# Patient Record
Sex: FEMALE | Race: WHITE
[De-identification: names, ages, dates, MRNs, and addresses within clinical notes are randomized per-mention and may not be internally consistent; named-entity substitution may affect disease eponyms.]

---

## 2022-12-18 ENCOUNTER — HOSPITAL ENCOUNTER (INPATIENT)
Dept: HOSPITAL 97 - ER | Age: 87
LOS: 3 days | Discharge: NURSING, REHAB OR LONG-TERM CARE | DRG: 64 | End: 2022-12-21
Attending: INTERNAL MEDICINE | Admitting: HOSPITALIST
Payer: COMMERCIAL

## 2022-12-18 VITALS — BODY MASS INDEX: 24.2 KG/M2
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
## 2022-12-18 DIAGNOSIS — G81.91: ICD-10-CM

## 2022-12-18 DIAGNOSIS — C91.10: ICD-10-CM

## 2022-12-18 DIAGNOSIS — N13.2: ICD-10-CM

## 2022-12-18 DIAGNOSIS — I21.A1: ICD-10-CM

## 2022-12-18 DIAGNOSIS — M62.82: ICD-10-CM

## 2022-12-18 DIAGNOSIS — I10: ICD-10-CM

## 2022-12-18 DIAGNOSIS — G92.8: ICD-10-CM

## 2022-12-18 DIAGNOSIS — R55: ICD-10-CM

## 2022-12-18 DIAGNOSIS — I63.9: Primary | ICD-10-CM

## 2022-12-18 DIAGNOSIS — S00.03XA: ICD-10-CM

## 2022-12-18 DIAGNOSIS — E05.90: ICD-10-CM

## 2022-12-18 DIAGNOSIS — N39.0: ICD-10-CM

## 2022-12-18 LAB
BUN BLD-MCNC: 33 MG/DL (ref 7–18)
GLUCOSE SERPLBLD-MCNC: 143 MG/DL (ref 74–106)
HCT VFR BLD CALC: 46 % (ref 36–45)
INR BLD: 1.15
LYMPHOCYTES # SPEC AUTO: 20.9 K/UL (ref 0.7–4.9)
MCV RBC: 98.2 FL (ref 80–100)
MORPHOLOGY BLD-IMP: (no result)
PMV BLD: 8.8 FL (ref 7.6–11.3)
POTASSIUM SERPL-SCNC: 4.4 MMOL/L (ref 3.5–5.1)
RBC # BLD: 4.68 M/UL (ref 3.86–4.86)
TROPONIN I SERPL HS-MCNC: 197.1 PG/ML (ref ?–58.9)
TROPONIN I SERPL HS-MCNC: 241 PG/ML (ref ?–58.9)

## 2022-12-18 PROCEDURE — 99291 CRITICAL CARE FIRST HOUR: CPT

## 2022-12-18 PROCEDURE — 80048 BASIC METABOLIC PNL TOTAL CA: CPT

## 2022-12-18 PROCEDURE — 93306 TTE W/DOPPLER COMPLETE: CPT

## 2022-12-18 PROCEDURE — 74177 CT ABD & PELVIS W/CONTRAST: CPT

## 2022-12-18 PROCEDURE — 83735 ASSAY OF MAGNESIUM: CPT

## 2022-12-18 PROCEDURE — 70551 MRI BRAIN STEM W/O DYE: CPT

## 2022-12-18 PROCEDURE — 83605 ASSAY OF LACTIC ACID: CPT

## 2022-12-18 PROCEDURE — 97530 THERAPEUTIC ACTIVITIES: CPT

## 2022-12-18 PROCEDURE — 80061 LIPID PANEL: CPT

## 2022-12-18 PROCEDURE — 76377 3D RENDER W/INTRP POSTPROCES: CPT

## 2022-12-18 PROCEDURE — 97161 PT EVAL LOW COMPLEX 20 MIN: CPT

## 2022-12-18 PROCEDURE — 99292 CRITICAL CARE ADDL 30 MIN: CPT

## 2022-12-18 PROCEDURE — 51702 INSERT TEMP BLADDER CATH: CPT

## 2022-12-18 PROCEDURE — 81001 URINALYSIS AUTO W/SCOPE: CPT

## 2022-12-18 PROCEDURE — 85610 PROTHROMBIN TIME: CPT

## 2022-12-18 PROCEDURE — 87186 SC STD MICRODIL/AGAR DIL: CPT

## 2022-12-18 PROCEDURE — 87086 URINE CULTURE/COLONY COUNT: CPT

## 2022-12-18 PROCEDURE — 93880 EXTRACRANIAL BILAT STUDY: CPT

## 2022-12-18 PROCEDURE — 97112 NEUROMUSCULAR REEDUCATION: CPT

## 2022-12-18 PROCEDURE — 84443 ASSAY THYROID STIM HORMONE: CPT

## 2022-12-18 PROCEDURE — 96360 HYDRATION IV INFUSION INIT: CPT

## 2022-12-18 PROCEDURE — 81003 URINALYSIS AUTO W/O SCOPE: CPT

## 2022-12-18 PROCEDURE — 87088 URINE BACTERIA CULTURE: CPT

## 2022-12-18 PROCEDURE — 83036 HEMOGLOBIN GLYCOSYLATED A1C: CPT

## 2022-12-18 PROCEDURE — 76770 US EXAM ABDO BACK WALL COMP: CPT

## 2022-12-18 PROCEDURE — 93005 ELECTROCARDIOGRAM TRACING: CPT

## 2022-12-18 PROCEDURE — 84132 ASSAY OF SERUM POTASSIUM: CPT

## 2022-12-18 PROCEDURE — 82565 ASSAY OF CREATININE: CPT

## 2022-12-18 PROCEDURE — 87077 CULTURE AEROBIC IDENTIFY: CPT

## 2022-12-18 PROCEDURE — 36415 COLL VENOUS BLD VENIPUNCTURE: CPT

## 2022-12-18 PROCEDURE — 86850 RBC ANTIBODY SCREEN: CPT

## 2022-12-18 PROCEDURE — 70544 MR ANGIOGRAPHY HEAD W/O DYE: CPT

## 2022-12-18 PROCEDURE — 72125 CT NECK SPINE W/O DYE: CPT

## 2022-12-18 PROCEDURE — 81015 MICROSCOPIC EXAM OF URINE: CPT

## 2022-12-18 PROCEDURE — 70549 MR ANGIOGRAPH NECK W/O&W/DYE: CPT

## 2022-12-18 PROCEDURE — 86901 BLOOD TYPING SEROLOGIC RH(D): CPT

## 2022-12-18 PROCEDURE — 80053 COMPREHEN METABOLIC PANEL: CPT

## 2022-12-18 PROCEDURE — 71260 CT THORAX DX C+: CPT

## 2022-12-18 PROCEDURE — 85025 COMPLETE CBC W/AUTO DIFF WBC: CPT

## 2022-12-18 PROCEDURE — 86900 BLOOD TYPING SEROLOGIC ABO: CPT

## 2022-12-18 PROCEDURE — 84484 ASSAY OF TROPONIN QUANT: CPT

## 2022-12-18 PROCEDURE — 82550 ASSAY OF CK (CPK): CPT

## 2022-12-18 PROCEDURE — 70486 CT MAXILLOFACIAL W/O DYE: CPT

## 2022-12-18 PROCEDURE — 96361 HYDRATE IV INFUSION ADD-ON: CPT

## 2022-12-18 PROCEDURE — 84100 ASSAY OF PHOSPHORUS: CPT

## 2022-12-18 PROCEDURE — 87811 SARS-COV-2 COVID19 W/OPTIC: CPT

## 2022-12-18 PROCEDURE — 70450 CT HEAD/BRAIN W/O DYE: CPT

## 2022-12-18 RX ADMIN — ENOXAPARIN SODIUM SCH MG: 60 INJECTION SUBCUTANEOUS at 22:59

## 2022-12-18 RX ADMIN — CEFTRIAXONE SCH MLS: 1 INJECTION, POWDER, FOR SOLUTION INTRAMUSCULAR; INTRAVENOUS at 22:48

## 2022-12-18 RX ADMIN — METOPROLOL TARTRATE SCH MG: 50 TABLET, FILM COATED ORAL at 23:00

## 2022-12-18 RX ADMIN — Medication SCH ML: at 23:00

## 2022-12-18 RX ADMIN — SODIUM CHLORIDE SCH MLS: 0.9 INJECTION, SOLUTION INTRAVENOUS at 22:47

## 2022-12-18 NOTE — EDPHYS
Physician Documentation                                                                           

 [facility]                                                                 

Name: Maida Clemons                                                                               

Age: 88 yrs                                                                                       

Sex: Female                                                                                       

: 1934                                                                                   

MRN: [id number]                                                                                   

Arrival Date: 2022                                                                          

Time: 11:23                                                                                       

Account#: [id number]                                                                            

Bed 16                                                                                            

Private MD:                                                                                       

ED Physician Ava Mak                                                                         

HPI:                                                                                              

                                                                                             

13:20 This 88 yrs old Female presents to ER via EMS with complaints of Fall Injury.           sp3 

13:20 88-year-old female with history of hyperlipidemia, CLL, TIA not on any cancer treatment sp3 

      currently presents to the ED via EMS for ground-level fall and being dropped on the         

      ground since 8 PM yesterday. Patient was found by her son who activated EMS patient was     

      brought to the ER. Patient has facial injuries he also laid on her face overnight. She      

      denies any headache, back pain, neck pain, chest pain, shortness of breath, abdominal       

      pain, any other pain symptoms at this time. Vital signs were normal for EMS..               

                                                                                                  

Historical:                                                                                       

- Allergies:                                                                                      

11:44 No Known Allergies;                                                                     kc6 

- Home Meds:                                                                                      

12:08 folic acid 1 mg oral tab [Active]; atorvastatin 40 mg oral tab [Active]; methimazole 5  kc6 

      mg oral tab [Active];                                                                       

- PMHx:                                                                                           

12:06 Hyperthyroidism; Hypercholesterolemia; TIA; CLL;                                        kc6 

- PSHx:                                                                                           

12:06 Cholecystectomy; hysterectomy; back surgery;                                            kc6 

                                                                                                  

- Immunization history: Last tetanus immunization: unknown.                                       

- Social history:: Smoking status: Patient denies any tobacco usage or history of.                

                                                                                                  

                                                                                                  

ROS:                                                                                              

13:22 Eyes: Negative for injury, pain, redness, and discharge, Neck: Negative for injury,     sp3 

      pain, and swelling, Cardiovascular: Negative for chest pain, palpitations, and edema,       

      Respiratory: Negative for shortness of breath, cough, wheezing, and pleuritic chest         

      pain, Abdomen/GI: Negative for abdominal pain, nausea, vomiting, diarrhea, and              

      constipation, Back: Negative for injury and pain, Skin: Negative for injury, rash, and      

      discoloration, Neuro: Negative for headache, weakness, numbness, tingling, and seizure,     

      Psych: Negative for depression, anxiety, suicide ideation, homicidal ideation, and          

      hallucinations, Allergy/Immunology: Negative for hives, rash, and allergies, Endocrine:     

      Negative for neck swelling, polydipsia, polyuria, polyphagia, and marked weight changes.    

13:22 All other systems are negative.                                                             

                                                                                                  

Exam:                                                                                             

13:23 Neck:  Trachea midline, no thyromegaly or masses palpated, and no cervical              sp3 

      lymphadenopathy.  Supple, full range of motion without nuchal rigidity, or vertebral        

      point tenderness.  No Meningismus. Chest/axilla:  Normal chest wall appearance and          

      motion.  Nontender with no deformity.  No lesions are appreciated. Cardiovascular:          

      Regular rate and rhythm with a normal S1 and S2.  No gallops, murmurs, or rubs.  Normal     

      PMI, no JVD.  No pulse deficits. Respiratory:  Lungs have equal breath sounds               

      bilaterally, clear to auscultation and percussion.  No rales, rhonchi or wheezes noted.     

       No increased work of breathing, no retractions or nasal flaring. Abdomen/GI:  Soft,        

      non-tender, with normal bowel sounds.  No distension or tympany.  No guarding or            

      rebound.  No evidence of tenderness throughout. Back:  No spinal tenderness.  No            

      costovertebral tenderness.  Full range of motion. Skin:  Warm, dry with normal turgor.      

      Normal color with no rashes, no lesions, and no evidence of cellulitis. Neuro:  Awake       

      and alert, GCS 15, oriented to person, place, time, and situation.  Cranial nerves          

      II-XII grossly intact.  Motor strength 5/5 in all extremities.  Sensory grossly intact.     

       Cerebellar exam normal.  Normal gait. Psych:  Awake, alert, with orientation to            

      person, place and time.  Behavior, mood, and affect are within normal limits.               

13:23 Head/face: Patient has ecchymoses on the left side of her face, nose, forehead.  No         

      neck tenderness noted.  Vision is normal and extraocular movements are intact.  There       

      is no trauma or blood in the anterior chamber of the eyes..                                 

13:24 ECG was reviewed by the Attending Physician. EKG demonstrates normal sinus rhythm at 88 sp3 

      bpm with left bundle branch block, with no concordance, otherwise normal EKG.               

                                                                                                  

Vital Signs:                                                                                      

11:42  / 65; Pulse 92; Resp 18 S; Temp 98.2(O); Pulse Ox 96% on R/A; Weight 54.43 kg    kc6 

      (R); Height 4 ft. 11 in. (149.86 cm) (R); Pain 0/10;                                        

12:04 Weight 54.43 kg; Height 4 ft. 11 in. (149.86 cm);                                       eb  

14:15  / 85; Pulse 90; Resp 19 S; Pulse Ox 94% on R/A; Pain 0/10;                       kc6 

16:56 Pulse 86; Resp 18 S; Pulse Ox 100% on R/A; Pain 0/10;                                   kc6 

12:04 Body Mass Index 24.24 (54.43 kg, 149.86 cm)                                             eb  

                                                                                                  

James Coma Score:                                                                               

11:42 Eye Response: spontaneous(4). Verbal Response: confused(4). Motor Response: obeys       kc6 

      commands(6). Total: 14.                                                                     

                                                                                                  

Trauma Score (Adult):                                                                             

11:42 Eye Response: spontaneous(1); Verbal Response: confused(1); Motor Response: obeys       kc6 

      commands(2); Systolic BP: > 89 mm Hg(4); Respiratory Rate: 10 to 29 per min(4); Alger     

      Score: 14; Trauma Score: 12                                                                 

                                                                                                  

MDM:                                                                                              

11:23 Patient medically screened.                                                             sp3 

13:24 Data reviewed: vital signs, nurses notes, lab test result(s), EKG, radiologic studies.  sp3 

13:25 ED course: 88-year-old female with ground-level mechanical fall and overnight           sp3 

      entrapment onto the floor. Trauma studies are pending including CT scan of the head,        

      C-spine, facial bones, chest/abdomen/pelvis. Laboratory values also-year-old a              

      rhabdomyolysis with a CPK value of 1982 and troponin of 197. Patient has an elevated        

      WBC count but is stable given her history of CLL. We will start hydration and admit         

      patient for rhabdomyolysis and general support for her injuries. Likely discharge in        

      next 24 to 48 hours once she is ambulating again. Creatinine values are normal. We will     

      transfer patient if anything significant from a trauma standpoint however I do not          

      anticipate significant injury on her pending CT scans..                                     

                                                                                                  

                                                                                             

11:25 Order name: Basic Metabolic Panel; Complete Time: 13:07                                 sp3 

                                                                                             

11:25 Order name: CBC with Diff; Complete Time: 13:                                         sp3 

                                                                                             

11:25 Order name: Type And Screen; Complete Time: 13:                                       sp3 

                                                                                             

11:25 Order name: PT-INR; Complete Time: 11:59                                                sp3 

                                                                                             

11:25 Order name: Lactate w/ 2H reflex if indic.; Complete Time: 13:07                        sp3 

                                                                                             

11:25 Order name: CK; Complete Time: 13:07                                                    3 

                                                                                             

11:25 Order name: Troponin High Sensitivity; Complete Time: 13:07                             3 

                                                                                             

12:04 Order name: SARS RAPID; Complete Time: 13:07                                              

                                                                                             

12:33 Order name: Manual Differential; Complete Time: 13:07                                   [facility]

                                                                                             

12:40 Order name: ABO/RH no charge; Complete Time: 13:07                                      [facility]

                                                                                             

13:40 Order name: Urine Culture                                                               [facility] 

                                                                                             

13:40 Order name: Urine Microscopic Only                                                      [facility] 

                                                                                             

13:40 Order name: Urine Dipstick-Ancillary                                                    [facility]

                                                                                             

17:19 Order name: Creatine Phosphokinase                                                      [facility]

                                                                                             

11:25 Order name: CT Traumagram (Head C Spine CAP W Con)                                      [facility] 

                                                                                             

11:25 Order name: Labs collected and sent; Complete Time: 12:04                               [facility] 

                                                                                             

11:25 Order name: CT Facial Bones W/O Con; Complete Time: 13:27                               [facility] 

                                                                                             

11:29 Order name: Head C Spine Cap W Con; Complete Time: 13:27                                [facility]

                                                                                             

17:19 Order name: CONS Physician Consult                                                      [facility]

                                                                                             

17:19 Order name: Heart Healthy                                                               [facility]

                                                                                             

17:19 Order name: Echo with Doppler                                                           [facility]

                                                                                             

17:19 Order name: Creatine Phosphokinase                                                      [facility]

                                                                                             

17:19 Order name: Creatine Phosphokinase                                                      [facility]

                                                                                             

17:19 Order name: Creatine Phosphokinase                                                      [facility]

                                                                                             

17:19 Order name: Creatine Phosphokinase                                                      [facility]

                                                                                             

17:19 Order name: Lipid Profile                                                               [facility]

                                                                                             

17:19 Order name: Lipid Profile                                                               [facility]

                                                                                             

17:19 Order name: Carotid Artery Bilateral                                                    [facility]

                                                                                             

17:19 Order name: Carotid Artery Bilateral                                                    [facility]

                                                                                             

11:25 Order name: EKG - Nurse/Tech; Complete Time: 12:04                                      3 

                                                                                             

12:05 Order name: Roberto; Complete Time: 12:31                                                 kc6 

                                                                                             

13:40 Order name: Urine Dipstick-Ancillary (obtain specimen); Complete Time: 13:40            kc6 

                                                                                                  

Administered Medications:                                                                         

13:40 Drug: NS 0.9% 1000 ml Route: IV; Rate: 1 bolus; Site: right antecubital;                kc6 

16:39 Follow up: Response: No adverse reaction; IV Status: Completed infusion; IV Intake:     kc6 

      1000ml                                                                                      

                                                                                                  

                                                                                                  

Disposition Summary:                                                                              

22 13:31                                                                                    

Hospitalization Ordered                                                                           

      Hospitalization Status: Observation                                                     sp3 

      Provider: Fausto Page                                                                sp3 

      Condition: Stable                                                                       sp3 

      Problem: new                                                                            sp3 

      Symptoms: are unchanged                                                                 sp3 

      Bed/Room Type: Standard                                                                 sp3 

      Location: Telemetry/MedSurg (Inpatient)(22 17:24)                                   

      Room Assignment: [facility](22 17:24)                                                      

      Diagnosis                                                                                   

        - Rhabdomyolysis                                                                      sp3 

        - Mechanical fall, scalp hematoma, facial contusions                                  sp3 

      Forms:                                                                                      

        - Medication Reconciliation Form                                                      sp3 

        - SBAR form                                                                           sp3 

Signatures:                                                                                       

Dispatcher [employer]                           EDGilda Leos RN                      RN                                                      

Ava Mak MD MD   sp3                                                  

Evelyn James RN                   RN   kc6                                                  

                                                                                                  

Corrections: (The following items were deleted from the chart)                                    

17:24 13:31 Telemetry/MedSurg (observation) sp3                                                 

17:24 13:31 sp3                                                                               ss  

                                                                                                  

**************************************************************************************************

## 2022-12-18 NOTE — RAD REPORT
EXAM DESCRIPTION:  CT - Head C  Spine Cap W Con - 12/18/2022 12:48 pm

 

CLINICAL HISTORY:  trauma, fall with head and facial trauma, neck pain, chest pain and abdomen pain

 

COMPARISON:  <Comparisons>

 

TECHNIQUE:  Axial 5 mm CT head images were obtained. Axial 2 mm CT cervical spine images were obtaine
d with sagittal and coronal reconstruction images reviewed. During dynamic enhancement of 100mL non-i
onic contrast, axial 5 mm images of the chest, abdomen and pelvis were obtained. Biphasic technique p
erformed of the abdomen and pelvis.

 

Oral contrast: None

 

All CT scans are performed using dose optimization technique as appropriate and may include automated
 exposure control or mA/KV adjustment according to patient size.

 

FINDINGS:  No intracranial hemorrhage, mass or edema. No midline shift or abnormal fluid collection. 
Mild for age atrophy and chronic ischemic change. Ventricles are in proportion. Arterial and physiolo
gic calcifications are present. Mastoid air cells are clear. No skullbase fracture seen. No skull fra
cture.  Facial bones, orbits and sinuses are separately detailed. Moderate size scalp hematoma seen l
eft-side of the skull. No underlying bone injury.

 

Advanced degenerative changes are present at the dens -C1 level. Transverse ligament posterior to the
 dens is thickened and partially mineralized. Several degenerative cysts are clustered at the base of
 the dens, largest 7 mm in size. The large cyst has a thinned posterior cortex at the base of the den
s. A fracture is not confirmed. Anterior subluxation of C7 on T1 is present secondary to facet degene
rative change. All cervical disc levels except C2-3 show significant loss in disc height. Prominent e
ndplate spurs are present at C5-6 and C6-7. Multilevel facet joint degenerative change seen with mult
ilevel bony foraminal stenosis. No other alignment abnormalities. Paraspinal hematoma is not seen. Th
ere is some congestion and edema in the subcutaneous fat on the left. There are numerous bilateral ce
rvical lymph nodes present more pronounced on the left. No malignant history is known for the patient
. A pharyngeal origin mass is not seen. Adenopathy extends into the left base the neck and supraclavi
cular region. Concerns for traumatic disc herniation or traumatic cord injury can be further addresse
d with MR imaging. Small nodules are seen in the thyroid gland without of the mass in.

 

CT chest shows no pneumothorax, pulmonary contusion or pleural fluid collection. No mediastinal hemat
omega and the aorta and pulmonary arteries are unremarkable. No chest wall mass. No fracture of the ann
rnum seen. There are no displaced rib fracture is present and no nondisplaced rib fractures suspected
. Patient has a large hiatal hernia approximately 50% of the stomach intrathoracic.

 

Abnormal pathologic appearing right axillary lymph nodes are present. A small number of lymph nodes s
een in the left axilla.

 

No acute traumatic injury to the liver, spleen or pancreas. Splenomegaly is present. Gallbladder is a
bsent. No abnormal degree of biliary tree dilatation. Fullness of the biliary tree is within range of
 normal for a post cholecystectomy patient. Left-sided parapelvic cysts are present. There is a large
 staghorn calculus filling and enlarging the right collecting system. There is mild dilatation throug
hout the course of the right ureter without obstructing calculus seen. Roberto catheter is present in t
he urinary bladder which is partially filled. There is intermediate density material layering along t
he dependent portion of the urinary bladder. This could be hemorrhagic material. Mass is possible but
 lesser in likelihood. No bladder wall thickening is suspected.

 

No acute bowel finding seen. Moderate stool volume dilates the rectum to 7 cm.

 

Advanced bony degenerative changes are present. Significant left convex lumbar scoliosis is present. 
An acute pelvic or vertebral body finding is not seen. No fracture of either proximal femur.

 

No significant vascular finding.

 

 

IMPRESSION:  No hemorrhage or other acute intracranial finding identifiable. Moderate size scalp hema
lorri seen on the left.

 

Facial bones, orbits and sinuses are separately detailed.

 

Advanced degenerative changes are present involving the dens and adjacent structures but no fracture 
of the dens or C2 body confirmed. Overall prominent cervical spine degenerative change with no acute 
bone abnormality.

 

Pathologic lymphadenopathy is present in the neck and bilateral axilla. No malignant history is known
 for this patient though findings are concerning for lymphoma or other neoplastic process.

 

No acute traumatic injury to the chest.

 

No acute traumatic injury to the abdomen or pelvis. Right-sided hydronephrosis is present without an 
obstructing calculus. There is a large right-sided staghorn calculus. Intermediate density material l
bernard along the posterior or dependent portion of the urinary bladder. This could be hemorrhagic mate
rial. A mass is unlikely but not entirely excluded. Roberto catheter is in place.

 

Advanced degenerative changes to the spine without acute finding seen. No pelvic or proximal femur fr
acture.

## 2022-12-18 NOTE — ER
Nurse's Notes                                                                                     

 [facility]                                                                 

Name: Maida Clemons                                                                               

Age: 88 yrs                                                                                       

Sex: Female                                                                                       

: 1934                                                                                   

MRN: [id number]                                                                                   

Arrival Date: 2022                                                                          

Time: 11:23                                                                                       

Account#: [id number]                                                                            

Bed 16                                                                                            

Private MD:                                                                                       

Diagnosis: Rhabdomyolysis;Mechanical fall, scalp hematoma, facial contusions                      

                                                                                                  

Presentation:                                                                                     

                                                                                             

11:29 Chief complaint: EMS states: client had an unwitnessed fall last night at about 8pm and kc6 

      was just found 30min ago. no blood thinners, client denies LOC. Care prior to arrival:      

      None. Mechanism of Injury: Fall from standing position. fell to the floor. Trauma event     

      details: Injury occurred in the [facility], Injury occurred: at home. Injury        

      occurred: 2022 Injury occurred at: 20:00.                                      

11:29 Acuity: YUDI 1                                                                           kc6 

11:29 Method Of Arrival: EMS: [facility]                                                       [facility] 

11:44 Ebola Screen: No symptoms or risks identified at this time. Initial Sepsis Screen: Does kc6 

      the patient meet any 2 criteria? Altered Mental Status. No. Patient's initial sepsis        

      screen is negative. Does the patient have a suspected source of infection? No.              

      Patient's initial sepsis screen is negative. Risk Assessment: Do you want to hurt           

      yourself or someone else? Patient reports no desire to harm self or others. Onset of        

      symptoms was 2022 at 20:00.                                                    

14:43 Coronavirus screen: At this time, the client does not indicate any symptoms associated  kc6 

      with coronavirus-19.                                                                        

                                                                                                  

Trauma Activation: Alert                                                                          

 Physician: ED Physician; Name: Dr. Mak; Notified At: ; Arrived At:                             

 Physician: General Surgeon; Name: ; Notified At: ; Arrived At:                                   

 Physician: Radiology; Name: ; Notified At: ; Arrived At:                                         

 Physician: Respiratory; Name: ; Notified At: ; Arrived At:                                       

 Physician: Lab; Name: ; Notified At: ; Arrived At:                                               

                                                                                                  

Historical:                                                                                       

- Allergies:                                                                                      

11:44 No Known Allergies;                                                                     kc6 

- Home Meds:                                                                                      

12:08 folic acid 1 mg oral tab [Active]; atorvastatin 40 mg oral tab [Active]; methimazole 5  kc6 

      mg oral tab [Active];                                                                       

- PMHx:                                                                                           

12:06 Hyperthyroidism; Hypercholesterolemia; TIA; CLL;                                        kc6 

- PSHx:                                                                                           

12:06 Cholecystectomy; hysterectomy; back surgery;                                            kc6 

                                                                                                  

- Immunization history: Last tetanus immunization: unknown.                                       

- Social history:: Smoking status: Patient denies any tobacco usage or history of.                

                                                                                                  

                                                                                                  

Screenin:43 Abuse screen: Denies threats or abuse. Denies injuries from another. Tuberculosis       kc6 

      screening: No symptoms or risk factors identified.                                          

12:09 [facility] ED Fall Risk Assessment (Adult) History of falling in the last 3 months,       kc6 

      including since admission Yes- single mechanical fall (1 pt) Confusion or                   

      Disorientation Yes (5 pts) Intoxicated or Sedated No (0 pts) Impaired Gait Yes (1 pt)       

      Mobility Assist Device Used Yes (1 pt) Altered Elimination Yes (1 pt) Score/Fall Risk       

      Level 3 or more points = High Risk Oriented to surroundings, Maintained a safe              

      environment, Educated pt \T\ family on fall prevention, incl call for assistance when       

      getting out of bed, Assessed \T\ reinforced patient's understanding of fall precautions,    

      Provided non-skid footwear, Hourly rounding (assess needs \T\ fall precautionary            

      measures) done, Used ambulatory aids as needed (educated on \T\ assisted with), Used gait   

      belt as appropriate Implemented a Fall Risk Plan of Care, Apply high fall risk patient      

      identification: yellow non skid footwear/ fall signage, Placed fall mat w/ non beveled      

      edge next to bed, Activated bed/chair alarm, Remained with patient while ambulating,        

      Utilized family, sitter, or virtual  as indicated. Nutritional screening: No       

      deficits noted.                                                                             

                                                                                                  

Primary Survey:                                                                                   

11:37 NO uncontrolled hemorrhage observed. Breathing/Chest: Spontaneous respiratory effort,   kc6 

      equal unlabored respirations, breath sounds clear bilaterally, regular pattern,             

      symmetrical chest rise and fall. Respiratory effort: spontaneous, unlabored, Breath         

      sounds: clear, bilaterally. Respiratory pattern: regular, Chest inspection: symmetrical     

      rise and fall of the chest. Circulation: No external hemorrhage present. Regular and        

      strong central pulse, skin warm/dry/normal color. Hemorrhage: No external hemorrhage        

      noted. Pulses: Skin color: pink, Skin temperature: warm, dry, Cardiac rhythm: sinus         

      rhythm Heart tones present. Disability Pupils are equal, round, reactive to light and       

      accommodation. Client is alert. Client responds to verbal stimuli. Client reponds to        

      painful stimuli. Exposure/Environment: All clothing and personal items were removed.        

      There is no evidence of uncontrolled external bleeding. No obvious injuries are noted       

      at this time. A warming method has been applied: A warm blanket has been provided to        

      the patient.                                                                                

12:15 Reassessment Alertness and Airway: Awake and alert. The airway is patent. Airway Patent kc6 

      Breathing: Spontaneous respiratory effort, equal unlabored respirations, breath sounds      

      clear bilaterally, regular pattern with symmetrical chest rise and fall. Respiratory        

      effort Spontaneous Unlabored Breath sounds Clear Respiratory pattern Regular Chest          

      inspection Symmetrical Circulation: No external hemorrhage noted. Regular and strong        

      central pulse, skin warm/dry/normal color. Heart rhythm Sinus rhythm Heart tones            

      Present Color Pink Temperature Warm Dry Disability: Pupils Pupils are equal, round,         

      reactive to light and accomodation. Alert Verbal stimuli Painful stimuli.                   

                                                                                                  

Secondary Survey:                                                                                 

11:40 HEENT: Face Other bruising and redness noted to the left face, chin, and neck. Nose:    kc6 

      dried blood noted to the left nare.. Gastrointestinal: No deficits noted. : No signs      

      and/or symptoms were reported regarding the genitourinary system. Musculoskeletal: No       

      signs and/or symptoms reported regarding the musculoskeletal system.                        

                                                                                                  

Assessment:                                                                                       

11:32 General: Appears in no apparent distress. comfortable, Behavior is calm, cooperative,   kc6 

      appropriate for age. General: Smells of stale urine. Pain: Denies pain. Neuro: Baez     

      Agitation-Sedation Scale (RASS): 0 - Alert and Calm Level of Consciousness is awake,        

      alert, obeys commands, Oriented to person, place. EENT: Sclera/Cornea are reddened in       

      outer aspect of conjuctiva of right eye, inner aspect of conjuctiva of right eye, outer     

      aspect of conjuctiva of left eye and inner aspect of conjunctiva of left eye Nares          

      dried blood noted to the left nare.. Cardiovascular: Heart tones S1 S2 present              

      Capillary refill < 3 seconds. Respiratory: Airway is patent Trachea midline Respiratory     

      effort is even, unlabored, Respiratory pattern is regular, symmetrical, Breath sounds       

      are clear bilaterally. GI: No signs and/or symptoms were reported involving the             

      gastrointestinal system. : No signs and/or symptoms were reported regarding the           

      genitourinary system. Derm:. Derm: Skin has skin tears on left side of the face, chin,      

      and neck as well as the MARICEL knees. Skin is dry, Skin is normal, Skin temperature is         

      warm. Musculoskeletal: No signs and/or symptoms reported regarding the musculoskeletal      

      system. Circulation, motion, and sensation intact. Capillary refill < 3 seconds, Range      

      of motion: intact in all extremities.                                                       

11:47 Reassessment: cleansed patient of urine soaked clothes and placed into a clean gown and ss  

      brief. Side rails up x 2. Pt tolerated well. Awaiting CT.                                   

12:35 Reassessment: Patient appears in no apparent distress at this time. No changes from     kc6 

      previously documented assessment. Patient and/or family updated on plan of care and         

      expected duration. Pain level reassessed. client is alert and oriented to person and        

      place but not time and situation. respiration even and unlabored. skin is warm, dry,        

      and pink. Patient denies pain at this time.                                                 

13:35 Reassessment: Patient appears in no apparent distress at this time. No changes from     kc6 

      previously documented assessment. Patient and/or family updated on plan of care and         

      expected duration. Pain level reassessed. client is alert and oriented to person and        

      place but situation and time. respirations even and unlabored. skin is warm, dry, and       

      pink. Patient denies pain at this time.                                                     

14:27 Reassessment: Augustus Clemons(son) [phone].                                            jd3 

14:35 Reassessment: Patient appears in no apparent distress at this time. No changes from     kc6 

      previously documented assessment. Patient and/or family updated on plan of care and         

      expected duration. Pain level reassessed.                                                   

15:35 Reassessment: Patient appears in no apparent distress at this time. No changes from     kc6 

      previously documented assessment. Patient and/or family updated on plan of care and         

      expected duration. Pain level reassessed. Reassessment: Patient appears in no apparent      

      distress at this time. No changes from previously documented assessment. Patient and/or     

      family updated on plan of care and expected duration. Pain level reassessed.                

16:35 Reassessment: Patient appears in no apparent distress at this time. No changes from     kc6 

      previously documented assessment. Patient and/or family updated on plan of care and         

      expected duration. Pain level reassessed.                                                   

17:35 Reassessment: Patient appears in no apparent distress at this time. No changes from     kc6 

      previously documented assessment. Patient and/or family updated on plan of care and         

      expected duration. Pain level reassessed.                                                   

17:36 Reassessment: attempted to call report to second floor. stated the nurse is passing     kc6 

      meds and will call me back.                                                                 

18:09 Reassessment: attempted to call report to second floor after missed call. stated the    kc 

      nurse is passing meds and will have to call me back.                                        

                                                                                                  

Vital Signs:                                                                                      

11:42  / 65; Pulse 92; Resp 18 S; Temp 98.2(O); Pulse Ox 96% on R/A; Weight 54.43 kg    kc6 

      (R); Height 4 ft. 11 in. (149.86 cm) (R); Pain 0/10;                                        

12:04 Weight 54.43 kg; Height 4 ft. 11 in. (149.86 cm);                                       eb  

14:15  / 85; Pulse 90; Resp 19 S; Pulse Ox 94% on R/A; Pain 0/10;                       kc6 

16:56 Pulse 86; Resp 18 S; Pulse Ox 100% on R/A; Pain 0/10;                                   kc6 

12:04 Body Mass Index 24.24 (54.43 kg, 149.86 cm)                                             eb  

                                                                                                  

James Coma Score:                                                                               

11:42 Eye Response: spontaneous(4). Verbal Response: confused(4). Motor Response: obeys       kc6 

      commands(6). Total: 14.                                                                     

                                                                                                  

Trauma Score (Adult):                                                                             

11:42 Eye Response: spontaneous(1); Verbal Response: confused(1); Motor Response: obeys       kc6 

      commands(2); Systolic BP: > 89 mm Hg(4); Respiratory Rate: 10 to 29 per min(4); James     

      Score: 14; Trauma Score: 12                                                                 

                                                                                                  

ED Course:                                                                                        

11:23 Patient arrived in ED.                                                                  eb  

11:23 Ava Mak MD is Attending Physician.                                                sp3 

11:28 Evelyn James, RN is Primary Nurse.                                                 kc6 

11:32 Triage completed.                                                                       kc6 

11:44 Arm band placed on.                                                                     kc6 

11:45 Patient has correct armband on for positive identification. Placed in gown. Bed in low  kc6 

      position. Call light in reach. Side rails up X2.                                            

11:45 Patient maintains SpO2 saturation greater than 95% on room air.                         kc6 

11:45 Thermoregulation: warm blanket given to patient.                                        kc6 

12:31 SARS RAPID Sent.                                                                        kc6 

12:31 Roberto cath inserted, using sterile technique, 16 Fr., by me, balloon inflated, to       kc6 

      gravity drainage, clamped. Patient tolerated well.                                          

12:49 CT Facial Bones W/O Con In Process Unspecified.                                         EDMS

12:50 Head C Spine Cap W Con In Process Unspecified.                                          EDMS

13:30 Fausto Page MD is Hospitalizing Provider.                                           sp3 

14:42 No provider procedures requiring assistance completed. Patient admitted, IV remains in  kc6 

      place.                                                                                      

                                                                                                  

Administered Medications:                                                                         

13:40 Drug: NS 0.9% 1000 ml Route: IV; Rate: 1 bolus; Site: right antecubital;                kc6 

16:39 Follow up: Response: No adverse reaction; IV Status: Completed infusion; IV Intake:     kc6 

      1000ml                                                                                      

                                                                                                  

                                                                                                  

Medication:                                                                                       

11:47 VIS not applicable for this client.                                                     ss  

                                                                                                  

Intake:                                                                                           

16:39 IV: 1000ml; Total: 1000ml.                                                              kc6 

                                                                                                  

Outcome:                                                                                          

13:31 Decision to Hospitalize by Provider.                                                    sp3 

14:42 Condition: stable                                                                       kc6 

14:42 Instructed on the need for admit.                                                           

14:43 Patient's length of stay in the Emergency Department was greater than 2 hours. due to   kc6 

      results and admission.Patient's length of stay extended due to                              

17:37 Admitted to Med/surg accompanied by tech, via stretcher, room 203, with chart, Report   kc6 

      called to  GREGG Rojas                                                                       

19:07 Patient left the ED.                                                                    jb4 

                                                                                                  

Signatures:                                                                                       

Dispatcher [employer]                           EDMS                                                 

Gilda Robertson RN RN ss Bryson, James, RN RN   jb4                                                  

Patrice Yancey RN                    RN   Renae Portillo Setul, MD MD   sp3                                                  

Evelyn James RN                   RN   kc6                                                  

                                                                                                  

Corrections: (The following items were deleted from the chart)                                    

14:14 11:42  / 65; Pulse 92bpm; Resp 18bpm; Spontaneous; Pulse Ox 96% RA; Pain 0/10; kc6kc6 

16:56 11:37 Reassessment Alertness and Airway: Awake and alert. The airway is patent. Airway  kc6 

      Patent Breathing: Spontaneous respiratory effort, equal unlabored respirations, breath      

      sounds clear bilaterally, regular pattern with symmetrical chest rise and fall.             

      Respiratory effort Spontaneous Unlabored Breath sounds Clear Respiratory pattern            

      Regular Chest inspection Symmetrical Circulation: No external hemorrhage noted. Regular     

      and strong central pulse, skin warm/dry/normal color. Heart rhythm Sinus rhythm Heart       

      tones Present Color Pink Temperature Warm Dry Disability: Pupils Pupils are equal,          

      round, reactive to light and accomodation. Alert Verbal stimuli Painful stimuli kc6         

18:54 17:37 Admitted to Med/surg accompanied by tech, via stretcher, room 203, with chart, [id number] 

                                                                                                  

**************************************************************************************************

## 2022-12-18 NOTE — RAD REPORT
EXAM DESCRIPTION:  CT - Facial Bones W/ Mpr - 12/18/2022 12:47 pm

 

CLINICAL HISTORY:  Fall, face and left-sided skull injury.

 

COMPARISON:  CT trauma grams same date

 

TECHNIQUE:  Axial 2 millimeter thick images of the facial bones were obtained with sagittal and coron
al reconstruction imaging.

 

All CT scans are performed using dose optimization technique as appropriate and may include automated
 exposure control or mA/KV adjustment according to patient size.

 

FINDINGS:  Mandible is intact. Condyles are normally positioned. Mastoid air cells are clear with no 
skullbase fracture seen. No acute paranasal sinus finding. Globes and orbital contents are normal. Fa
cial bone fracture is not seen. Left-sided scalp hematoma is present over the partially imaged. Under
lying bone is intact. Pathologic lymphadenopathy is not seen further detailed on the CT trauma report
.

 

IMPRESSION:  No facial bone fracture.

 

Please see significant additional findings on CT trauma report.

## 2022-12-19 LAB
ALBUMIN SERPL BCP-MCNC: 2.8 G/DL (ref 3.4–5)
ALP SERPL-CCNC: 89 U/L (ref 45–117)
ALT SERPL W P-5'-P-CCNC: 55 U/L (ref 13–56)
AST SERPL W P-5'-P-CCNC: 103 U/L (ref 15–37)
BUN BLD-MCNC: 28 MG/DL (ref 7–18)
GLUCOSE SERPLBLD-MCNC: 95 MG/DL (ref 74–106)
HCT VFR BLD CALC: 37.4 % (ref 36–45)
HDLC SERPL-MCNC: 38 MG/DL (ref 40–60)
LDLC SERPL CALC-MCNC: 27 MG/DL (ref ?–130)
LYMPHOCYTES # SPEC AUTO: 18.5 K/UL (ref 0.7–4.9)
MCV RBC: 98.9 FL (ref 80–100)
MORPHOLOGY BLD-IMP: (no result)
PMV BLD: 8.6 FL (ref 7.6–11.3)
POTASSIUM SERPL-SCNC: 3.6 MMOL/L (ref 3.5–5.1)
RBC # BLD: 3.78 M/UL (ref 3.86–4.86)
SMUDGE CELLS BLD QL SMEAR: (no result)
TROPONIN I SERPL HS-MCNC: 239.5 PG/ML (ref ?–58.9)

## 2022-12-19 RX ADMIN — CEFTRIAXONE SCH MLS: 1 INJECTION, POWDER, FOR SOLUTION INTRAMUSCULAR; INTRAVENOUS at 08:44

## 2022-12-19 RX ADMIN — CEFTRIAXONE SCH MLS: 1 INJECTION, POWDER, FOR SOLUTION INTRAMUSCULAR; INTRAVENOUS at 22:03

## 2022-12-19 RX ADMIN — METOPROLOL TARTRATE SCH MG: 50 TABLET, FILM COATED ORAL at 08:45

## 2022-12-19 RX ADMIN — ENOXAPARIN SODIUM SCH MG: 60 INJECTION SUBCUTANEOUS at 08:45

## 2022-12-19 RX ADMIN — Medication SCH ML: at 08:45

## 2022-12-19 RX ADMIN — Medication SCH ML: at 22:04

## 2022-12-19 RX ADMIN — SODIUM CHLORIDE SCH MLS: 0.9 INJECTION, SOLUTION INTRAVENOUS at 08:44

## 2022-12-19 RX ADMIN — Medication SCH ML: at 22:05

## 2022-12-19 RX ADMIN — SODIUM CHLORIDE SCH MLS: 0.9 INJECTION, SOLUTION INTRAVENOUS at 22:03

## 2022-12-19 RX ADMIN — ATORVASTATIN CALCIUM SCH MG: 40 TABLET, FILM COATED ORAL at 22:04

## 2022-12-19 RX ADMIN — ASPIRIN SCH MG: 81 TABLET, COATED ORAL at 08:45

## 2022-12-19 RX ADMIN — CLOPIDOGREL BISULFATE SCH MG: 75 TABLET, FILM COATED ORAL at 18:30

## 2022-12-19 NOTE — P.HP
Certification for Inpatient


Patient admitted to: Inpatient


With expected LOS: >2 Midnights


Patient will require the following post-hospital care: None


Practitioner: I am a practitioner with admitting privileges, knowledge of 

patient current condition, hospital course, and medical plan of care.


Services: Services provided to patient in accordance with Admission requirements

found in Title 42 Section 412.3 of the Code of Federal Regulations





Patient History


Date of Service: 12/18/22


Reason for admission: Syncope; rhabdomyolysis; status post fall with facial 

bruising


History of Present Illness: 





Patient is an 88-year-old female who came to the hospital after falling.  

Patient had a friend come by her home at around room 4:00 p.m. on Saturday but 

she did not answer the door. the next morning she was scheduled to get her nails

done but she was not picking up the phone.  This was around 9:00 a.m..  Her son 

went by the house and found her on the ground.  She was laying face down.  She 

appeared obtunded.  EMS was called and they brought her into the hospital.  

Patient had a lot of facial bruising.  Extensive facial bruising to the left 

side of her face as well as bilateral knee bruising and some mild swelling.  

Patient has pretty good range of motion in her hips.  She moves her arms and 

legs appropriately.  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  She did 

not have a pronator drift.  She does follow commands fairly well.  Her son is 

stating that her mentation is improved from when she 1st got in the hospital.  

In the emergency room she had extensive workup done including CT imaging and 

there was no evidence of fractures of the extremity.  She did have some 

lymphadenopathy suggestive of lymphoma.  She does have a history of CLL.  The 

family  believes they have been told that this was secondary to her CLL.  She 

was also found to have a staghorn calculus.  She has right-sided hydronephrosis.

  Patient also had elevated CPK.  Will hydrate.  Will treat with antibiotics for

presumed urinary tract infection.  We will also consult Urology and get a renal 

ultrasound.  She has no flank tenderness at this time. Patient will be admitted 

to the hospital for further treatment.


Allergies





NKDA Allergy (Uncoded 01/08/16 17:35)


   Unknown





Home Medications: 








Docusate Sodium [Colace] 50 mg PO DAILY 08/13/14 


Hydrocodone 10/APAP 325 [Norco 10/325*] 1 tab PO Q4HP PRN 08/13/14 


Pravastatin Sodium [Pravachol] 20 mg PO BEDTIME 08/13/14 


Vits A,C,E/Lutein/Minerals [Ocuvite with Lutein Tablet] 1 each PO DAILY 08/13/14




methIMAzole [Tapazole] 0.5 tab PO DAILY 08/13/14 


methocarbamoL [Robaxin*] 500 mg PO TID PRN 08/13/14 


Levofloxacin [Levaquin] 500 mg PO DAILY #7 tablet 08/14/14 


metroNIDAZOLE [Flagyl*] 500 mg PO Q8H #21 tablet 08/14/14 








- Past Medical/Surgical History


Has patient received pneumonia vaccine in the past: No


Diabetic: No


-: Sciatica


-: CLL


-: Lumpectomy Right Breast


-: Cholecystectomy


-: Hysterectomy


-: Left Wrist Surgery


-: MARICEL knee replacement





- Family History


  ** Father


Family History: Reviewed- Non-Contributory





- Social History


Smoking Status: Former smoker


Alcohol use: Yes


Caffeine use: Yes


Place of Residence: Home





Review of Systems


10-point ROS is otherwise unremarkable





Physical Examination





- Vital Signs


Temperature: 98.1 F


Blood Pressure: 145/64


Pulse: 84


Respirations: 19


Pulse Ox (%): 96





- Physical Exam


General: Alert, In no apparent distress, Oriented x2, Confused


HEENT: Atraumatic, PERRLA, Mucous membr. moist/pink, EOMI, Sclerae nonicteric


Neck: Supple, 2+ carotid pulse no bruit, No LAD, Without JVD or thyroid 

abnormality


Respiratory: Clear to auscultation bilaterally, Normal air movement


Cardiovascular: Regular rate/rhythm, Normal S1 S2, No murmurs


Gastrointestinal: Normal bowel sounds, Soft and benign, Non-distended, No 

tenderness


Musculoskeletal: No clubbing, No swelling, No tenderness


Integumentary: No rashes


Neurological: Normal gait, Normal speech, Normal strength at 5/5 x4 extr, Normal

 tone, Sensation intact, Cranial nerves 3-12 intact, Normal affect


Lymphatics: No axilla or inguinal lymphadenopathy





- Studies


Laboratory Data (last 24 hrs)





12/18/22 11:41: PT 12.6 H, INR 1.15


12/18/22 11:41: WBC 35.40 H*, Hgb 14.8, Hct 46.0 H, Plt Count 192


12/18/22 11:41: Sodium 142, Potassium 4.4, BUN 33 H, Creatinine 0.72, Glucose 

143 H








Assessment & Plan





- Problems (Diagnosis)


(1) Rhabdomyolysis


Current Visit: Yes   Status: Acute   





(2) CLL (chronic lymphocytic leukemia)


Current Visit: Yes   Status: Acute   





(3) Elevated troponin


Current Visit: Yes   Status: Acute   





(4) Facial contusion


Current Visit: Yes   Status: Acute   





(5) Bilateral knee swelling


Current Visit: Yes   Status: Acute   





(6) Staghorn calculus


Current Visit: Yes   Status: Acute   





(7) Hydronephrosis


Current Visit: Yes   Status: Acute   





- Plan





Plan:


1.  Gentle hydration


2.  IV antibiotics 


3.  Renal ultrasound


4.  Echo


5.  Cardiology consult


6.  Urology consultation 


7. monitor CPK level


8. follow troponin trend


9. physical therapy evaluation


10. MRI of the brain


11.  Carotid Doppler


12. GI and DVT prophylaxis


Discharge Plan: Home


Plan to discharge in: Greater than 2 days





- Advance Directives


Does patient have a Living Will: No


Does patient have a Durable POA for Healthcare: Yes





- Code Status/Comfort Care


Code Status Assessed: Yes


Code Status: Full Code


Critical Care: No


Time Spent Managing PTS Care (In Minutes): 45

## 2022-12-19 NOTE — RAD REPORT
EXAM DESCRIPTION:  MRI - Brain Wo Cont - 12/19/2022 9:50 am

 

CLINICAL HISTORY:  Syncope; r/o CVA

 

COMPARISON:  Head C  Spine Cap W Con dated 12/18/2022

 

TECHNIQUE:  Sagittal T1-weighted images were obtained along with axial PD, heavily T2-weighted and T2
-FLAIR images. Axial DWI and ADC mapping sequences were also obtained along with coronal heavily T2-w
eighted images.

 

FINDINGS:  Diffusion imaging shows no acute infarction in the cerebral or cerebellar hemispheres. Of 
the medulla pontine junction slightly left of midline there is a punctate 1 mm area increased signal 
on the diffusion weighted sequence. There is a corresponding area of diminished signal on the ADC map
ping sequence. This would suggest a punctate acute/ subacute nonhemorrhagic infarction. No definitive
 correlate on the T2/IR sequencing. No other suspicion for an acute infarction. The patient has atrop
hy with ventricles in proportion. Chronic ischemic changes are scattered in the cerebral white matter
. There is no edema or shift of midline structures. No extra-axial fluid collections. Gray-matter/whi
te matter junction is preserved. Signal voids are seen as a normal finding in the major intracranial 
vessels.

 

No globe or orbital content acute finding.

 

Mastoid air cells and paranasal sinuses are clear.

 

 

IMPRESSION:  Punctate 1 mm sized brainstem nonhemorrhagic acute/ subacute CVA just left of midline at
 the medulla brunilda junction.

 

Moderate atrophy with ventricles in proportion. Mild to moderate for age scattered chronic ischemic c
hange in the cerebral white matter.

## 2022-12-19 NOTE — RAD REPORT
EXAM DESCRIPTION:  US - Renal Ultrasound-Complete - 12/19/2022 2:25 pm

 

CLINICAL HISTORY:  Abdominal pain

 

COMPARISON:  July 2022

 

FINDINGS:  The right kidney measures 9 cm with a normal echotexture. 1.8 calculus unchanged. No hydro
nephrosis

 

The left kidney measures 9 cm with a normal echotexture. No significant change in either the parapelv
ic cysts or mild left hydronephrosis

 

Hydronephrosis is not seen.

 

Roberto catheter within a collapsed bladder.

 

IMPRESSION:  1.8 centimeter nonobstructing right renal calculus

 

Left renal parapelvic cysts or mild hydronephrosis unchanged

## 2022-12-19 NOTE — ECHO
HEIGHT: 4 ft 11 in   WEIGHT: 120 lb 0 oz   DATE OF STUDY: 12/19/2022   REFER DR: Fausto Page MD

2-DIMENSIONAL: YES

     M.MODE: YES

 DOPPLER: YES

COLOR FLOW: YES



                    TDS:  NO

PORTABLE: YES

 DEFINITY:  NO

BUBBLE STUDY: NO





DIAGNOSIS:  CHEST PAIN, RULE OUT ACS



CARDIAC HISTORY:  

CATHERIZATION: NO

SURGERY: NO

PROSTHETIC VALVE: NO

PACEMAKER: NO





MEASUREMENTS (cm)

    DIASTOLIC (NORMALS)                 SYSTOLIC (NORMALS)

IVSd                 1.0 (0.6-1.2)                    LA Diam 2.4 (1.9-4.0)     LVEF       
  60-65%  

LVIDd               3.4 (3.5-5.7)                        LVIDs      2.1 (2.0-3.5)     %FS  
        38%

LVPWd             1.0 (0.6-1.2)

Ao Diam           2.2 (2.0-3.7)



2 DIMENSIONAL ASSESSMENT:

RIGHT ATRIUM:                   NORMAL

LEFT ATRIUM:       NORMAL



RIGHT VENTRICLE:            NORMAL

LEFT VENTRICLE: NORMAL



TRICUSPID VALVE:            

MITRAL VALVE:     NORMAL



PULMONIC VALVE:             NORMAL

AORTIC VALVE:     



PERICARDIAL EFFUSION: NONE

AORTIC ROOT:      NORMAL





LEFT VENTRICULAR WALL MOTION:     NORMAL



DOPPLER/COLOR FLOW:     SEE BELOW



COMMENTS:      

  1. NORMAL LEFT VENTRICULAR EJECTION FRACTION 60-65% WITH NORMAL WALL MOTION.

  2. MILD DIASTOLIC DYSFUNCTION.

  3. MILD TRICUSPID REGURGITATION.

  4. MILD TO MODERATE AORTIC REGURGITATION.



TECHNOLOGIST:   THOMAS RAMACHANDRAN

## 2022-12-19 NOTE — P.PN
Subjective


Date of Service: 12/19/22


Chief Complaint: Syncope; rhabdomyolysis; status post fall with facial bruising


No acute events overnight. No recurrent episodes of syncope. She appears 

slightly confused, but is answering questions appropriately.





Review of Systems


10-point ROS is otherwise unremarkable


Neurological: Confusion





Physical Examination





- Vital Signs


Temperature: 97.9 F


Blood Pressure: 141/73


Pulse: 71


Respirations: 14


Pulse Ox (%): 96





- Physical Exam


General: Alert, Oriented x2


HEENT: Mucous membr. moist/pink, Other (facial contusion), EOMI, Sclerae 

nonicteric


Neck: JVD not distended


Respiratory: Clear to auscultation bilaterally, Normal air movement


Cardiovascular: Normal pulses, Regular rate/rhythm, Normal S1 S2, No gallops, No

rubs, No murmurs


Gastrointestinal: Normal bowel sounds, Soft and benign, Non-distended, No 

tenderness, No rebound, No guarding


Musculoskeletal: No clubbing


Integumentary: No rashes


Neurological: Normal speech, Normal affect





Assessment And Plan





- Plan


 # Acute Toxic Metabolic Encephalopathy likely secondary to Urinary Tract 

Infection


- Evaluation thus far: 


   - Labs = Na+ 143, Ca2+ 10.0, CO2 23, Glucose 95, BUN 28, TSH pending


   - Urinalysis = 3+ blood, 3+ leukocyte esterase, >50 RBCs, >50 WBCs, 3+ 

protein


   - CT head = "no hemorrhage or other acute intracranial finding identifiable."


   - MRI brain = pending


- Management plan: 


   - Follow-up above tests


   - Consulted Neurology - recommendations appreciated


   - Continue ceftriaxone





# Suspected Type II Non-ST Segment Elevation Myocardial Infarction (Demand 

Ischemia)


- Evaluation thus far: 


   - EKG: reportedly no STEMI criteria, trend       


   - Serial troponin: 197.1 -> 241.0 -> 239.5


   - Transthoracic echocardiogram = pending


- Management plan: 


   - Consult Cardiology - recommendations appreciated 


   - Continue aspirin + metoprolol 


   - Start atorvastatin


   - Consider starting ACE-inhibitor/ARB if tolerated





# Traumatic Ground-Level Fall with Left Scalp Hematoma


# Rhabdomyolysis


# Facial Contusion


- CK trend: 197.1 -> 241.0 -> 239.5


- CT facial bones = "no facial bone fracture."


- CT head/cervical spine/chest/abdomen/pelvis = "no hemorrhage or other acute 

intracranial finding identifiable. Moderate size scalp hematoma seen on the 

left. Facial bones, orbits and sinuses are separately detailed. Advanced 

degenerative changes are present involving the dens and adjacent structures but 

no fracture of the dens or C2 body confirmed. Overall prominent cervical spine 

degenerative change with no acute bone abnormality. Pathologic lymphadenopathy 

is present in the neck and bilateral axilla. No malignant history is known for 

this patient though findings are concerning for lymphoma or other neoplastic 

process. No acute traumatic injury to the chest. No acute traumatic injury to 

the abdomen or pelvis. Right-sided hydronephrosis is present without an 

obstructing calculus. There is a large right-sided staghorn calculus. Intermedi

ate density material layers along the posterior or dependent portion of the 

urinary bladder. This could be hemorrhagic material. A mass is unlikely but not 

entirely excluded. Roberto catheter is in place. Advanced degenerative changes to 

the spine without acute finding seen. No pelvic or proximal femur fracture."


- Continue Normal Saline @ 100 mL/hr





# Right-Sided Hydronephrosis with Large Right-Sided Staghorn Calculus


- Urology consulted - recommendations appreciated


- Renal ultrasound pending





# Chronic Lymphocytic Leukemia


# Neck and Bilateral Axillary Lymphadenopathy


- Follow-up with Oncology for further evaluation/management








Updated her son, Mr. Coffman. All questions answered to the best of my ability.





Eliot Yo M.D.

## 2022-12-19 NOTE — RAD REPORT
EXAM DESCRIPTION:   - CP - 12/18/2022 9:49 pm

 

CLINICAL HISTORY:  Syncope

 

COMPARISON:  Thyroid Para Parotid Gland dated 8/14/2017

 

TECHNIQUE:  Real-time sonographic evaluation of bilateral carotid and vertebral systems was performed
. Gray scale and Doppler interrogation were performed with waveform tracing bilaterally.

 

FINDINGS:  Normal high resistance waveforms are noted in both external carotid arteries. The common c
arotid arteries and internal carotid arteries show normal low resistance waveforms.

 

Bilateral common carotid wall intimal thickening seen. Noncalcified plaquing changes are identifiable
 in each carotid bulb without visual evidence for significant luminal narrowing. No dissection is see
n. Left vertebral artery was not well visualized. Peak systolic and end diastolic velocity values and
 the ICA/CCA ratios are in the non-hemodynamically significant range.

 

Antegrade flow seen in both vertebral arteries.

 

Velocity values and ratios were recorded and are retained in the patient's imaging records.

 

 

IMPRESSION:  Mild bilateral carotid bulb noncalcified plaquing changes.

 

No evidence of a hemodynamically significant stenosis.

## 2022-12-20 LAB
BUN BLD-MCNC: 29 MG/DL (ref 7–18)
GLUCOSE SERPLBLD-MCNC: 103 MG/DL (ref 74–106)
HCT VFR BLD CALC: 33.4 % (ref 36–45)
LYMPHOCYTES # SPEC AUTO: 19.5 K/UL (ref 0.7–4.9)
MCV RBC: 98.3 FL (ref 80–100)
PMV BLD: 8.3 FL (ref 7.6–11.3)
POTASSIUM SERPL-SCNC: 3.1 MMOL/L (ref 3.5–5.1)
RBC # BLD: 3.4 M/UL (ref 3.86–4.86)

## 2022-12-20 RX ADMIN — CLOPIDOGREL BISULFATE SCH MG: 75 TABLET, FILM COATED ORAL at 08:45

## 2022-12-20 RX ADMIN — SODIUM CHLORIDE SCH: 0.9 INJECTION, SOLUTION INTRAVENOUS at 10:00

## 2022-12-20 RX ADMIN — SODIUM CHLORIDE SCH: 0.9 INJECTION, SOLUTION INTRAVENOUS at 20:00

## 2022-12-20 RX ADMIN — Medication SCH ML: at 08:46

## 2022-12-20 RX ADMIN — CEFTRIAXONE SCH MLS: 1 INJECTION, POWDER, FOR SOLUTION INTRAMUSCULAR; INTRAVENOUS at 09:00

## 2022-12-20 RX ADMIN — Medication SCH ML: at 21:15

## 2022-12-20 RX ADMIN — FOLIC ACID SCH MG: 1 TABLET ORAL at 08:45

## 2022-12-20 RX ADMIN — Medication SCH ML: at 08:45

## 2022-12-20 RX ADMIN — SODIUM CHLORIDE SCH: 0.9 INJECTION, SOLUTION INTRAVENOUS at 00:00

## 2022-12-20 RX ADMIN — ATORVASTATIN CALCIUM SCH MG: 40 TABLET, FILM COATED ORAL at 21:15

## 2022-12-20 RX ADMIN — Medication SCH ML: at 21:00

## 2022-12-20 RX ADMIN — ASPIRIN SCH MG: 81 TABLET, COATED ORAL at 08:45

## 2022-12-20 NOTE — RAD REPORT
EXAM DESCRIPTION:  MRI - MRA Head Wo Cont - 12/20/2022 3:59 pm

 

CLINICAL HISTORY:  CVA

CVA

 

COMPARISON:  Brain Wo Cont dated 12/19/2022

 

FINDINGS:  3D noncontrast time-of-flight MR angiography of the [place] was performed.

 

No aneurysm, flow-limiting stenosis or vascular malformation is seen. Forward flow seen in codominant
 vertebral arteries.

 

The visualized dural venous sinuses appear patent.

 

IMPRESSION:  No significant flow abnormality of the [place] is identified.

## 2022-12-20 NOTE — RAD REPORT
EXAM DESCRIPTION:  MRI - MRA Neck W/Wo Cont - 12/20/2022 4:03 pm

 

CLINICAL HISTORY:  CVA

Headache, drowsiness, CVA symptomology

 

COMPARISON:  Urinary Bladder dated 7/19/2022; Brain Wo Cont dated 12/19/2022

 

FINDINGS:  Contrast enhance 2D time-of-flight MR angiography of the neck vessels was performed.

 

A left aortic arch is noted. Normal 3 vessel origin of the great vessels noted.

 

Both subclavian arteries and common carotid arteries are patent.

 

No significant stenosis seen of either internal carotid artery. Antegrade flow is seen in both verteb
ral arteries.

 

IMPRESSION:  No significant carotid stenosis is identified.

## 2022-12-20 NOTE — P.CNS
Date of Consult: 12/20/22


Reason for Consult: Staghorn renal calculus


Requesting Physician: Eliot Yo


Chief Complaint: Syncope; rhabdomyolysis; status post fall with facial bruising


History of Present Illness: 


88-year-old woman was found to down and somewhat obtunded after a fall at home. 

She was transferred to the emergency department where she has subsequently been 

found to have had a CVA.  She denied any dysuria or gross hematuria, but she 

does complain of some mid lower back pain that extends up and down her spine.  

She denies any nausea or vomiting currently, though she notes having had some 

about 2 days ago.





Past medical and surgical history: CLL, status post KEANU and cholecystectomy, 

recent history of CVA





No known drug allergies





Examination:


Extremely hard of hearing


Alert and awake in no acute distress


No dyspnea or sign of respiratory distress


Seated in bed eating dinner


Abdomen soft, nontender, nondistended.


No CVA tenderness elicited


Urethral Roberto catheter in place draining clear yellow urine.





CT head down through pelvis with contrast identified the presence of a large 

right staghorn calculus with right-sided hydronephrosis.  Intermediate density 

material was seen layering along the posterior/dependent portion of the urinary 

bladder.  Urethral Roberto catheter was in place.





WBC currently 26.7 down from 35.4, which is apparently near her baseline with 

the CLL


Creatinine 0.43


12/18/2020 UA 3+ leukocyte Estrace, greater than 50 WBC per hpf, urine culture 

gram-negative rods





Assessment and recommendation:


88-year-old woman with CLL, recent CVA, found down after a fall incidentally 

found to have a 4.8 cm in maximum diameter staghorn calculus involving the mid 

and lower pole calyces of her right kidney with mild right hydronephrosis but no

JASMINE.


-Given her recent CVA and likely need for antiplatelet agents as well as the 

size of the staghorn calculus, likely from tumor lysis associated with the CLL, 

the burden of her stone would necessitate consideration for right PCNL as the 

primary mode of treatment.


While that is an invasive approach, and may be potentially significantly morbid 

for this woman of advanced age, the alternative includes a sandwich approach 

with ESWL, followed by ureteroscopy with laser lithotripsy, followed again if 

necessary by ESWL.


-Either way, she would need to be off any anticoagulants or antiplatelet agents 

in preparation for the procedure and for at minimum 5 to 7 days afterwards.


-In the interim, given the significant burden of stone, she would not likely be 

a candidate for a ureteral stent, and she would be better served with outpatient

placement of a right percutaneous nephrostomy tube, unless signs of systemic 

illness dictate need for more emergent IR consultation and PCN tube placement.


-KUB in the meantime to assess visibility of the calculi, as uric acid is likely

given the CLL, and the stones may not be calcified and thus may not be visible 

on plain film; thus not amenable to treatment with ESWL/the sandwich approach.





Allergies





No Known Allergies Allergy (Unverified 12/19/22 11:52)


   





Home Medications: 








Docusate Sodium [Colace] 50 mg PO DAILY 08/13/14 


Hydrocodone 10/APAP 325 [Norco 10/325*] 1 tab PO Q4HP PRN 08/13/14 


Pravastatin Sodium [Pravachol] 20 mg PO BEDTIME 08/13/14 


Vits A,C,E/Lutein/Minerals [Ocuvite with Lutein Tablet] 1 each PO DAILY 08/13/14




methIMAzole [Tapazole] 5 mg PO DAILY 08/13/14 


methocarbamoL [Robaxin*] 500 mg PO TID PRN 08/13/14 


Levofloxacin [Levaquin] 500 mg PO DAILY #7 tablet 08/14/14 


metroNIDAZOLE [Flagyl*] 500 mg PO Q8H #21 tablet 08/14/14 


Aspirin [Aspirin EC] 81 mg PO DAILY 12/19/22 


Atorvastatin Calcium [Lipitor] 40 mg PO BEDTIME 12/19/22 


Folic Acid 1 mg PO DAILY 12/19/22 


Pumpkin Seed Extract/Soy Germ [Azo Bladder Control Capsule] 1 tab PO BID 

12/19/22 








- Past Medical/Surgical History


Diabetic: No


-: Sciatica


-: CLL


-: Lumpectomy Right Breast


-: Cholecystectomy


-: Hysterectomy


-: Left Wrist Surgery


-: MARICEL knee replacement





- Family History


  ** Father


Family History: Reviewed- Non-Contributory





- Social History


Alcohol use: Yes


CD- Drugs: No


Caffeine use: Yes


Place of Residence: Home





Physical Examination














Temp Pulse Resp BP Pulse Ox


 


 97.9 F   78   14   129/58 L  97 


 


 12/20/22 16:00  12/20/22 16:00  12/20/22 16:00  12/20/22 16:00  12/20/22 16:00











- Problems


(1) Hydronephrosis, right


Current Visit: Yes   Status: Acute   





(2) CLL (chronic lymphocytic leukemia)


Current Visit: Yes   Status: Acute   





(3) Staghorn calculus


Current Visit: Yes   Status: Acute   


Conclusions/Impression: 


See HPI

## 2022-12-20 NOTE — CON
Reason For Consultation:  Consultation called because of syncope and small stroke.



History Of Present Illness:  Ms. Clemons is an 88-year-old patient with CLL who comes to [facility] after she was found down by family members, perhaps around 16 hours.  She lives alone and was 
last known to be doing well around 4:00 p.m. on Saturday, but the next morning when she did not answe
r her door to get her scheduled nails being done around 9:00 a.m. family checked on her and found her
 lying face down with the left arm under her.  Patient did not recall what happened.  She had papers 
strewn about her.  She had significant bruising on the left face, arms, and left thigh and knee.  She
 had a trauma series, which showed no fractures.  Her CPK was elevated, consistent with rhabdomyolysi
s.  Her brain MRI is done on Monday, that is yesterday identified a possible 1 mm punctate size brain
stem nonhemorrhagic stroke just left of the midline at the medulla brunilda junction.  There was moderate
 atrophy with ventricular increase in proportion.  Moderate small-vessel ischemic disease also noted.
  MRA of her head and neck were unremarkable.  Carotid artery ultrasound showed no evidence of hemody
namically significant stenosis.  An echocardiogram showed 60% to 65% ejection fraction, mild diastoli
c dysfunction, mild tricuspid regurgitation, and mild to moderate aortic regurgitation.  Since her 
spitalization, she is receiving fluids for her rhabdomyolysis.  She has been oriented, following comm
ands.  Did have some mild right upper extremity weakness being noted. 



As noted, she has CLL and her white blood cell count has been elevated at 35,000, on 18th, now 26,000
; neutrophils 34%, __________; lymphocytes 73.2%.  Kidney function essentially shows low potassium, e
levated BUN 29.  Potassium was 3.1, now corrected to 4 and she did have improving creatine kinase ini
tially 936, now 383.  Troponins were elevated to 241, now 239.  LDL cholesterol 27, HDL 38.  COVID te
st was negative.  Urinalysis showed a urinary tract infection with 3+ esterase, 2+ blood, 2+ ketones,
 greater than 50 white blood cells, however, bacteria less than 20.  She was treated with Rocephin fo
r urinary tract infection.



Past Medical History:  CLL, sciatica.



Past Surgical History:  Right breast lumpectomy, cholecystectomy, hysterectomy, left wrist surgery, b
ilateral knee replacement.



Family History:  Noncontributory.



Allergies:  NO KNOWN DRUG ALLERGIES.



Home Medications:  Colace 50 mg daily, Norco 10/325 every 4 hours as needed, Pravachol 20 mg at bedti
me, Ocuvite 1 tab daily, Tapazole __________ mg daily, Robaxin 500 mg 3 times daily as needed.



Review of Systems:

Aside from mentioned, patient denies any recent fevers or chills.  She does have urinary tract infect
ion in August.  There is some pain and swelling in the left upper and lower extremities, left face wh
ere she has a bruising.  Otherwise, no psychiatric issues.  No active genitourinary or gastrointestin
al issues.



Physical Examination:

Vital Signs:  Blood pressure 129/58, pulse 78, respiratory rate 14, temperature 98.1, oxygen saturati
on 97%.  Weight 120 pounds, height 4 feet 11 inches, BMI 24.2. 

General:  Ms. Clemons is resting in bed.  She does have some bruising noted over left cheek, left fore
head, and across the chin, also in the left arm and leg and hip region. 

Lungs:  She otherwise is clear to auscultation. 

Heart:  Regular. 

Abdomen:  Soft. 

Extremities:  Mild edema in extremities. 

Neurological:  She is alert and oriented to person, place, situation and follows commands appropriate
ly.  Cranial nerves show no significant focal deficits.  Her motor examination mild weakness noted in
 the right lower extremity 3/5 distally and proximally on the right 4/5 and this is likely from the r
habdomyolysis.  Upper extremities, again 4/5, but unlikely to be related to rhabdomyolysis.  Sensatio
n intact.  She does not have any extinction noted in visual field or sensory examination.  NIH Stroke
 Scale is around 2.  She will be ambulated with the physical therapist.



Assessment:  Ms. Clemons is an 88-year-old patient who had a small brainstem stroke.  There is some ri
ght-sided weakness.  She fell, was found down, has rhabdomyolysis that is resolving.  Urinary tract i
nfection, is on antibiotics.  She is on Lipitor for some dyslipidemia, aspirin 81 mg daily, and Plavi
x 75 mg daily.  She has folic acid 1 mg daily along with metoprolol.



Plan:  

1.Continue medications as noted.

2.Patient will be evaluated by Physical Therapy to see if she is a good candidate for inpatient reha
bilitation.  She was actually seen by the physical therapy service and it was noted that she would be
nefit from aggressive physical and occupational therapy to improve her overall functioning ability.

3.It is my recommendation the patient have aggressive physical therapy at this point to help to do w
ell.  It should be noted that she drink actually a glass of wine daily and she should be put on folic
 acid 1 mg daily along with thiamine __________ mg daily and carefully looked at for alcohol withdraw
al symptoms.





STEVE/ANDRE

DD:  12/20/2022 20:10:10Voice ID:  [id number]

DT:  12/20/2022 21:13:58Report ID:  [id number]

## 2022-12-20 NOTE — CON
Date of Consultation:  12/20/2022



Reason For Consultation:  Elevated troponin.



History Of Present Illness:  An 88-year-old female, who was brought to the hospital after she was fou
nd on the floor.  She apparently had a fall and stayed on the floor face down for a prolonged period 
of time and then she was brought into the emergency room.  She was found to have significant urinary 
tract infection and was very confused.  Troponin was slightly elevated; however, she had mild also rh
abdomyolysis.  The patient is not a good historian, could not get any symptoms elicited from her.



Past Medical History:  As per the chart, she has history of dementia, dyslipidemia, hypertension.



Medications:  Refer to reconciliation sheet for detailed list.



Allergies:  NO KNOWN DRUG ALLERGIES.



Family History:  No premature coronary artery disease or cancer.



Social History:  She does not smoke or drink.  Does not use any drugs.



Review of Systems:

All systems reviewed and they were negative except what mentioned in HPI.



Physical Examination:

Vital Signs:  showed temperature is 98.1, pulse 70, breathing at 14, blood pressure 116/54, saturatin
g 96% on room air. 

General:  Pleasant elderly female, in no apparent distress. 

Head and Neck:  Pupils are equal, reactive to light.  Intact eye movements.  No JVD.  No cervical lym
phadenopathy.  Neck is supple.  Thyroid is not enlarged.  There are some skin abrasions on the face a
nd the forehead. 

Lungs:  Clear to auscultation bilaterally.  No rhonchi, wheezing, or crackles.  No accessory muscle u
se. 

Heart:  Regular rate and rhythm, however, there is an aortic systolic ejection murmur. 

Abdomen:  Soft, nontender.  Bowel sounds positive.  No organomegaly.  No masses or hernia.  No rigidi
ty or rebound. 

Extremities:  No clubbing or cyanosis.  Intact pulses. 

Skin:  No rash or nodules.  She has some skin abrasions of the face and forehead. 

Neurologic:  She was not responding very well to perform a thorough neurological examination. 

Lymph Nodes:  No cervical or axillary lymphadenopathy.



Investigations:  The troponin peaked at 241 and it is coming down; however, her CK level is also 1982
.  BUN is 29, creatinine 0.43, hemoglobin is 10.9.  White blood cell count is 26,000, and her urine i
s full of pus, more than 50 white blood cells, and she had a brain MRI that showed a small acute infa
rct in the brainstem.



Assessment And Recommendations:  

1.Elevated troponin.  This is due to demand ischemia and may be a component of rhabdomyolysis.  Echo
 showed normal ejection fraction, no wall motion abnormalities.  No further cardiac workup is recomme
nded on this matter.

2.Altered mental status with fall and syncope.  This is due to the septic condition that she has and
 due to urinary tract infection.

3.Acute cerebrovascular accident.  Echo was normal and carotid Doppler was normal.  Recommend baby a
spirin and high-dose statin and recommend physical therapy.

4.Sepsis due to urinary tract infection.  She is on proper antibiotics.





SR/MODL

DD:  12/20/2022 15:28:35Voice ID:  [id number]

DT:  12/20/2022 15:55:06Report ID:  [id number]

## 2022-12-20 NOTE — P.PN
Subjective


Date of Service: 22


Chief Complaint: Syncope; rhabdomyolysis; status post fall with facial bruising


No acute events overnight. Her mental status is much improved today. She is now 

alert and oriented x 3. She states that her primary concern is generalized 

weakness. She denies any pain or discomfort.





Review of Systems


10-point ROS is otherwise unremarkable


General: Weakness (generalized)





Physical Examination





- Vital Signs


Temperature: 98.1 F


Blood Pressure: 116/54


Pulse: 70


Respirations: 14


Pulse Ox (%): 96





Assessment And Plan





- Plan


- Physical Exam


General: Alert, Oriented x2


HEENT: Mucous membr. moist/pink, Other (facial contusion), EOMI, Sclerae 

nonicteric


Neck: JVD not distended


Respiratory: Clear to auscultation bilaterally, Normal air movement


Cardiovascular: Normal pulses, Regular rate/rhythm, Normal S1 S2, No gallops, No

rubs, No murmurs


Gastrointestinal: Normal bowel sounds, Soft and benign, Non-distended, No 

tenderness, No rebound, No guarding


Musculoskeletal: No clubbing


Integumentary: No rashes


Neurological: Normal speech, Normal affect, CN III-XII in tact. Decreased 

hearing bilaterally (chronic), 5/5 strength in BUE, LLE, 4/5 strength in RLE. No

sensory deficits. 











NIH Stroke Scale


1a. Level of consciousness: 0 - Alert; keenly responsive  


1b. LOC questions: 0 - Both questions right


1c. LOC commands: 0 - Performs both tasks


2. Best Gaze: 0 - Normal


3. Visual: 0 - No visual loss


4. Facial Palsy: 0 - Normal symmetry


5a. Motor left arm: 0 - No drift for 10 seconds


5b. Motor right arm: 0 - No drift for 10 seconds


6a. Motor left le - No drift for 5 seconds


6b. Motor right le - Drift, but doesn't hit bed


7. Limb ataxia: 0 - No ataxia


8. Sensory: 0 - Normal; no sensory loss


9. Best Language: 0 - Normal; no aphasia


10. Dysarthria: 0 - Normal


11. Extinction and Inattention: 0 - No abnormality 


12. Distal motor function: 0 - No abnormality





Total Score: 1








# Acute Toxic Metabolic Encephalopathy likely secondary to Gram-Negative Urinary

Tract Infection and Acute/Subacute Brainstem Cerebrovascular Accident


- Evaluation thus far: 


   - Labs = Na+ 143, Ca2+ 10.0, CO2 23, Glucose 95, BUN 28, TSH pending


   - Urinalysis = 3+ blood, 3+ leukocyte esterase, >50 RBCs, >50 WBCs, 3+ 

protein


   - CT head = "no hemorrhage or other acute intracranial finding identifiable."


   - MRI brain = "punctate 1 mm sized brainstem nonhemorrhagic acute/ subacute 

CVA just left of midline at the medulla brunilda junction. Moderate atrophy with 

ventricles in proportion. Mild to moderate for age scattered chronic ischemic 

change in the cerebral white matter."


- Management plan: 


   - Consulted Neurology and spoke with Dr. Belcher - recommendations 

appreciated    


   - NIHSS = 1 


   - q4hr neurochecks


   - Ordered MRA head/neck 


   - Transthoracic echocardiogram = "1. normal left ventricular ejection 

fraction 60-65% with normal wall motion. 2. mild diastolic dysfunction. 3. mild 

tricuspid regurgitation. 4. mild to moderate aortic regurgitation"


   - PT/OT evaluation requested 


   - Cardiac risk profile: 


      - Hgb A1c = pending


      - Lipid panel = TC 82, TG 83, LDL 27, HDL 38


      - TSH = pending


   - Started aspirin, atorvastatin, clopidogrel, folic acid


   - Continue ceftriaxone for UTI


      - Await cultures and sensitivities





# Suspected Type II Non-ST Segment Elevation Myocardial Infarction (Demand 

Ischemia)


- Evaluation thus far: 


   - EKG: reportedly no STEMI criteria, trend       


   - Serial troponin: 197.1 -> 241.0 -> 239.5


   - Transthoracic echocardiogram = "1. normal left ventricular ejection 

fraction 60-65% with normal wall motion. 2. mild diastolic dysfunction. 3. mild 

tricuspid regurgitation. 4. mild to moderate aortic regurgitation"


- Management plan: 


   - Consult Cardiology - recommendations appreciated 


   - Continue aspirin + metoprolol 


   - Start atorvastatin


   - Consider starting ACE-inhibitor/ARB if tolerated





# Traumatic Ground-Level Fall with Left Scalp Hematoma


# Rhabdomyolysis


# Facial Contusion


- CK trend:  -> 926 -> 943 -> 383


- CT facial bones = "no facial bone fracture."


- CT head/cervical spine/chest/abdomen/pelvis = "no hemorrhage or other acute 

intracranial finding identifiable. Moderate size scalp hematoma seen on the 

left. Facial bones, orbits and sinuses are separately detailed. Advanced 

degenerative changes are present involving the dens and adjacent structures but 

no fracture of the dens or C2 body confirmed. Overall prominent cervical spine 

degenerative change with no acute bone abnormality. Pathologic lymphadenopathy 

is present in the neck and bilateral axilla. No malignant history is known for 

this patient though findings are concerning for lymphoma or other neoplastic 

process. No acute traumatic injury to the chest. No acute traumatic injury to 

the abdomen or pelvis. Right-sided hydronephrosis is present without an 

obstructing calculus. There is a large right-sided staghorn calculus. 

Intermediate density material layers along the posterior or dependent portion of

the urinary bladder. This could be hemorrhagic material. A mass is unlikely but 

not entirely excluded. Roberto catheter is in place. Advanced degenerative changes

to the spine without acute finding seen. No pelvic or proximal femur fracture."


- Continue Normal Saline @ 100 mL/hr





# Right-Sided Hydronephrosis with Large Right-Sided Staghorn Calculus


- Urology consulted - recommendations appreciated


- Renal ultrasound = "1.8 centimeter nonobstructing right renal calculus. Left 

renal parapelvic cysts or mild hydronephrosis unchanged."





# Chronic Lymphocytic Leukemia


# Neck and Bilateral Axillary Lymphadenopathy


- Follow-up with Oncology for further evaluation/management








Eliot Yo M.D.

## 2022-12-21 VITALS — OXYGEN SATURATION: 96 %

## 2022-12-21 VITALS — DIASTOLIC BLOOD PRESSURE: 72 MMHG | SYSTOLIC BLOOD PRESSURE: 183 MMHG

## 2022-12-21 VITALS — TEMPERATURE: 97.4 F

## 2022-12-21 LAB
BUN BLD-MCNC: 20 MG/DL (ref 7–18)
GLUCOSE SERPLBLD-MCNC: 165 MG/DL (ref 74–106)
HCT VFR BLD CALC: 34.9 % (ref 36–45)
LYMPHOCYTES # SPEC AUTO: 11.7 K/UL (ref 0.7–4.9)
MAGNESIUM SERPL-MCNC: 1.8 MG/DL (ref 1.6–2.4)
MCV RBC: 97.3 FL (ref 80–100)
PMV BLD: 8.1 FL (ref 7.6–11.3)
POTASSIUM SERPL-SCNC: 3.7 MMOL/L (ref 3.5–5.1)
RBC # BLD: 3.59 M/UL (ref 3.86–4.86)
TSH SERPL DL<=0.05 MIU/L-ACNC: 0.73 UIU/ML (ref 0.36–3.74)

## 2022-12-21 RX ADMIN — FOLIC ACID SCH MG: 1 TABLET ORAL at 09:54

## 2022-12-21 RX ADMIN — SODIUM CHLORIDE SCH MLS: 0.9 INJECTION, SOLUTION INTRAVENOUS at 00:54

## 2022-12-21 RX ADMIN — ASPIRIN SCH MG: 81 TABLET, COATED ORAL at 09:53

## 2022-12-21 RX ADMIN — SODIUM CHLORIDE SCH: 0.9 INJECTION, SOLUTION INTRAVENOUS at 06:00

## 2022-12-21 RX ADMIN — POTASSIUM & SODIUM PHOSPHATES POWDER PACK 280-160-250 MG SCH PKT: 280-160-250 PACK at 09:52

## 2022-12-21 RX ADMIN — POTASSIUM & SODIUM PHOSPHATES POWDER PACK 280-160-250 MG SCH PKT: 280-160-250 PACK at 09:53

## 2022-12-21 RX ADMIN — CEFTRIAXONE SCH MLS: 1 INJECTION, POWDER, FOR SOLUTION INTRAMUSCULAR; INTRAVENOUS at 09:51

## 2022-12-21 RX ADMIN — Medication SCH ML: at 09:00

## 2022-12-21 RX ADMIN — CLOPIDOGREL BISULFATE SCH MG: 75 TABLET, FILM COATED ORAL at 09:53

## 2022-12-21 NOTE — EKG
Test Date:    2022-12-18               Test Time:    11:56:34

Technician:   MARCELO                                    

                                                     

MEASUREMENT RESULTS:                                       

Intervals:                                           

Rate:         88                                     

GA:           122                                    

QRSD:         116                                    

QT:           392                                    

QTc:          474                                    

Axis:                                                

P:            65                                     

GA:           122                                    

QRS:          -72                                    

T:            68                                     

                                                     

INTERPRETIVE STATEMENTS:                                       

                                                     

Normal sinus rhythm

Left axis deviation

Incomplete left bundle branch block

Abnormal ECG

Compared to ECG 08/13/2014 00:06:16

Left-axis deviation now present

Left bundle-branch block now present

Sinus tachycardia no longer present

ST (T wave) deviation no longer present

Possible ischemia no longer present

Prolonged QT interval no longer present



Electronically Signed On 12-21-22 14:00:35 CST by Shaun Hollis

## 2022-12-21 NOTE — P.DS
Admission Date: 22


Discharge Date: 22


Disposition: TRANSFER TO INPATIENT REHAB


Discharge Condition: GOOD


Reason for Admission: Syncope; rhabdomyolysis; status post fall with facial 

bruising


Consultations: 


1. Neurology


2. Urology


3. Cardiology


Hospital Course: 





DIAGNOSES:


# Acute Toxic Metabolic Encephalopathy likely secondary to Klebsiella Oxytoca 

Urinary Tract Infection and Acute/Subacute Brainstem Cerebrovascular Accident 

(resolved)


# Right-Sided Hydronephrosis with Large Right-Sided Staghorn Calculus


# Suspected Type II Non-ST Segment Elevation Myocardial Infarction (Demand 

Ischemia)


# Traumatic Ground-Level Fall with Left Scalp Hematoma


# Rhabdomyolysis


# Facial Contusion


# Chronic Lymphocytic Leukemia


# Neck and Bilateral Axillary Lymphadenopathy


# Microscopic Hematuria





HOSPITAL COURSE:


Ms. Maida Clemons is a pleasant 88 year old female with a past medical history 

significant for chronic lymphocytic leukemia who was admitted to the [facility] on 2022 for altered mental status.





She was admitted to the Medicine service. Upon further evaluation, she was found

to have acute toxic metabolic encephalopathy as well as rhabdomyolosis. She was 

treated with IV fluids, with improvement in her rhabdomyolysis. In regards to 

her encephalopathy, she was found to have a Klebsiella Oxytoca urinary tract 

infection as well as a acute/subacute brainstem cerebrovascular accident. 

Neurology was consulted and she was evaluated by Dr. Belcher. He recommended s

tarting aspirin, atorvastatin, clopidogrel, and folic acid. She was evaluated by

Physical Therapy, and it was recommended that she be discharged to an inpatient 

rehab facility. With the assistance of case management, she was accepted to 

[facility].





Additionally, she was noted to have elevated troponins, so Cardiology was 

consulted. She was evaluated by Dr. Hollis, who felt that her troponin trend was

secondary to demand ischemia. He recommended continuing aspirin + atorvastatin.





She was also noted to have a staghorn calculus, for which Urology was consulted.

She was evaluated by Dr. Collins. Dr. Collins stated that she would likely need 

to be evaluated for a percutaneous nephrostomy tube as an outpatient once she is

cleared by Neurology to hold dual-antiplatelet therapy for 5-7 days. She was 

also advised to follow-up with Dr. Collins regarding her microscopic hematuria 

as this may be a sign of underlying malignancy. She verbalized understanding.





Of note, her evaluation was notable for neck and bilateral axillary 

lymphadenopathy. These findings were concerning for a potential underlying 

neoplasm. She has a history of CLL and has been following with an outpatient O

ncologist (Dr. Irwin). I recommended that she speak with (Dr. Irwin) regarding 

these findings for any additional testing. She verbalized understanding.





On 2022, she was seen on morning rounds and deemed medically stable for 

discharge. She was discharged with instructions to schedule follow-up 

appointments with her PCP (Dr. Ruby), with Neurology (Dr. Belcher), with 

Oncology (Dr. Irwin), and with Urology (Dr. Collins). She and her family members 

were given the opportunity to ask questions and reported no further questions. 

Furthermore, all questions were answered to the best of my ability.





A copy of this discharge summary will be sent to the above providers to 

facilitate continuity of care.





Today, I personally spent 35 minutes on her case, of which greater than 50% of 

the time was spent in patient education, counseling, and coordination of care as

described above.








- Physical Exam


General: Alert, Oriented x3


HEENT: Other (facial contusion), EOMI, Sclerae nonicteric


Neck: JVD not distended


Respiratory: Clear to auscultation bilaterally, Normal air movement


Cardiovascular: Normal pulses, Regular rate/rhythm, No gallops, No rubs, No 

murmurs


Gastrointestinal: Normal bowel sounds, Soft and benign, Non-distended, No 

tenderness, No rebound, No guarding


Musculoskeletal: No clubbing


Integumentary: No rashes


Neurological: Normal speech, Normal affect, CN III-XII in tact. Decreased 

hearing bilaterally (chronic), 5/5 strength in BUE, LLE, 4/5 strength in RLE. No

sensory deficits. 











NIH Stroke Scale


1a. Level of consciousness: 0 - Alert; keenly responsive  


1b. LOC questions: 0 - Both questions right


1c. LOC commands: 0 - Performs both tasks


2. Best Gaze: 0 - Normal


3. Visual: 0 - No visual loss


4. Facial Palsy: 0 - Normal symmetry


5a. Motor left arm: 0 - No drift for 10 seconds


5b. Motor right arm: 0 - No drift for 10 seconds


6a. Motor left le - No drift for 5 seconds


6b. Motor right le - Drift, but doesn't hit bed


7. Limb ataxia: 0 - No ataxia


8. Sensory: 0 - Normal; no sensory loss


9. Best Language: 0 - Normal; no aphasia


10. Dysarthria: 0 - Normal


11. Extinction and Inattention: 0 - No abnormality 


12. Distal motor function: 0 - No abnormality





Total Score: 1





Vital Signs/Physical Exam: 














Temp Pulse Resp BP Pulse Ox


 


 97.4 F   73   18   183/72 H  95 


 


 22 08:00  22 08:00  22 08:00  22 08:00  22 08:00








Laboratory Data at Discharge: 














WBC  16.50 K/uL (4.3-10.9)  H  22  05:02    


 


Hgb  11.6 g/dL (12.0-15.0)  L  22  05:02    


 


Hct  34.9 % (36.0-45.0)  L  22  05:02    


 


Plt Count  132 K/uL (152-406)  L  22  05:02    


 


PT  12.6 SECONDS (9.5-12.5)  H  22  11:41    


 


INR  1.15   22  11:41    


 


Sodium  141 mmol/L (136-145)   22  05:02    


 


Potassium  3.7 mmol/L (3.5-5.1)   22  05:02    


 


BUN  20 mg/dL (7-18)  H  22  05:02    


 


Creatinine  0.42 mg/dL (0.55-1.02)  L  22  05:02    


 


Glucose  165 mg/dL ()  H  22  05:02    


 


Phosphorus  1.5 mg/dL (2.5-4.9)  L  22  05:02    


 


Magnesium  1.8 mg/dL (1.6-2.4)   22  05:02    


 


Total Bilirubin  1.4 mg/dL (0.2-1.0)  H  22  06:47    


 


AST  103 U/L (15-37)  H  22  06:47    


 


ALT  55 U/L (13-56)   22  06:47    


 


Alkaline Phosphatase  89 U/L ()   22  06:47    


 


Triglycerides  83 mg/dL (<150)   22  06:13    


 


Cholesterol  82 mg/dL (<200)   22  06:13    


 


HDL Cholesterol  38 mg/dL (40-60)  L  22  06:13    


 


Cholesterol/HDL Ratio  2.16   22  06:13    








Home Medications: 








Docusate Sodium [Colace] 50 mg PO DAILY 14 


Hydrocodone 10/APAP 325 [Norco 10/325*] 1 tab PO Q4HP PRN 14 


Pravastatin Sodium [Pravachol] 20 mg PO BEDTIME 14 


Vits A,C,E/Lutein/Minerals [Ocuvite with Lutein Tablet] 1 each PO DAILY 14




methIMAzole [Tapazole] 5 mg PO DAILY 14 


methocarbamoL [Robaxin*] 500 mg PO TID PRN 14 


Levofloxacin [Levaquin] 500 mg PO DAILY #7 tablet 14 


Aspirin [Aspirin EC] 81 mg PO DAILY 22 


Atorvastatin Calcium [Lipitor] 40 mg PO BEDTIME 22 


Folic Acid 1 mg PO DAILY 22 


Pumpkin Seed Extract/Soy Germ [Azo Bladder Control Capsule] 1 tab PO BID 

22 


Aspirin Chewable [Aspirin Chewable*] 81 mg PO DAILY #1 tab.chew 22 


Atorvastatin Calcium [Lipitor] 40 mg PO BEDTIME  tab 22 


Cefdinir [Cefdinir*] 300 mg PO BID 10 Days #20 cap 22 


Clopidogrel Bisulfate [Plavix*] 75 mg PO DAILY 22 





New Medications: 


Aspirin Chewable [Aspirin Chewable*] 81 mg PO DAILY #1 tab.chew


Cefdinir [Cefdinir*] 300 mg PO BID 10 Days #20 cap


Physician Discharge Instructions: 


Once discharged from inpatient rehab:





- Please call and schedule a follow-up appointment with your PCP (Dr. Ruby)


- Please call and schedule a follow-up appointment with Neurology (Dr. Belcher)


- Please call and schedule a follow-up appointment with Urology (Dr. Collins)


   - He will discuss the possibility of a procedure to place a tube to drain 

your right kidney


- Please call and schedule a follow-up appointment with your Oncologist (Dr. Irwin)


   - You will need to discuss management of the swollen lymph nodes in your 

chest


Diet: AHA


Activity: Fall precautions


Followup: 


Theo Ruby MD [Primary Care Provider] -  (Please call to schedule as 

appointment once discharged from rehab.)


Ezio Belcher MD [ASSOCIATE-ACTIVE - CAN ADMIT] -  (Please call to 

schedule an appointment once discharge from rehab.)


Liam Collins [ACTIVE - CAN ADMIT] -  (Please call to schedule an appointment 

once discharged from rehab.)


Denise Hall MD [ACTIVE - CAN ADMIT] - 


Time spent managing pt's care (in minutes): 35